# Patient Record
Sex: FEMALE | Employment: OTHER | URBAN - METROPOLITAN AREA
[De-identification: names, ages, dates, MRNs, and addresses within clinical notes are randomized per-mention and may not be internally consistent; named-entity substitution may affect disease eponyms.]

---

## 2023-08-16 ENCOUNTER — APPOINTMENT (OUTPATIENT)
Dept: RADIOLOGY | Facility: MEDICAL CENTER | Age: 78
End: 2023-08-16
Attending: EMERGENCY MEDICINE
Payer: MEDICARE

## 2023-08-16 ENCOUNTER — HOSPITAL ENCOUNTER (EMERGENCY)
Facility: MEDICAL CENTER | Age: 78
End: 2023-08-17
Attending: EMERGENCY MEDICINE
Payer: MEDICARE

## 2023-08-16 DIAGNOSIS — J06.9 UPPER RESPIRATORY TRACT INFECTION, UNSPECIFIED TYPE: ICD-10-CM

## 2023-08-16 DIAGNOSIS — J40 BRONCHITIS: ICD-10-CM

## 2023-08-16 LAB
ALBUMIN SERPL BCP-MCNC: 4.3 G/DL (ref 3.2–4.9)
ALBUMIN/GLOB SERPL: 1.1 G/DL
ALP SERPL-CCNC: 115 U/L (ref 30–99)
ALT SERPL-CCNC: 14 U/L (ref 2–50)
ANION GAP SERPL CALC-SCNC: 11 MMOL/L (ref 7–16)
AST SERPL-CCNC: 24 U/L (ref 12–45)
BASOPHILS # BLD AUTO: 0.3 % (ref 0–1.8)
BASOPHILS # BLD: 0.03 K/UL (ref 0–0.12)
BILIRUB SERPL-MCNC: 0.4 MG/DL (ref 0.1–1.5)
BUN SERPL-MCNC: 26 MG/DL (ref 8–22)
CALCIUM ALBUM COR SERPL-MCNC: 9.3 MG/DL (ref 8.5–10.5)
CALCIUM SERPL-MCNC: 9.5 MG/DL (ref 8.5–10.5)
CHLORIDE SERPL-SCNC: 99 MMOL/L (ref 96–112)
CO2 SERPL-SCNC: 25 MMOL/L (ref 20–33)
CREAT SERPL-MCNC: 0.69 MG/DL (ref 0.5–1.4)
EOSINOPHIL # BLD AUTO: 0.17 K/UL (ref 0–0.51)
EOSINOPHIL NFR BLD: 2 % (ref 0–6.9)
ERYTHROCYTE [DISTWIDTH] IN BLOOD BY AUTOMATED COUNT: 51.3 FL (ref 35.9–50)
FLUAV RNA SPEC QL NAA+PROBE: NEGATIVE
FLUBV RNA SPEC QL NAA+PROBE: NEGATIVE
GFR SERPLBLD CREATININE-BSD FMLA CKD-EPI: 88 ML/MIN/1.73 M 2
GLOBULIN SER CALC-MCNC: 4 G/DL (ref 1.9–3.5)
GLUCOSE SERPL-MCNC: 110 MG/DL (ref 65–99)
HCT VFR BLD AUTO: 42.8 % (ref 37–47)
HGB BLD-MCNC: 13.8 G/DL (ref 12–16)
IMM GRANULOCYTES # BLD AUTO: 0.01 K/UL (ref 0–0.11)
IMM GRANULOCYTES NFR BLD AUTO: 0.1 % (ref 0–0.9)
LACTATE SERPL-SCNC: 1.1 MMOL/L (ref 0.5–2)
LYMPHOCYTES # BLD AUTO: 3.2 K/UL (ref 1–4.8)
LYMPHOCYTES NFR BLD: 36.9 % (ref 22–41)
MCH RBC QN AUTO: 30.7 PG (ref 27–33)
MCHC RBC AUTO-ENTMCNC: 32.2 G/DL (ref 32.2–35.5)
MCV RBC AUTO: 95.3 FL (ref 81.4–97.8)
MONOCYTES # BLD AUTO: 1.17 K/UL (ref 0–0.85)
MONOCYTES NFR BLD AUTO: 13.5 % (ref 0–13.4)
NEUTROPHILS # BLD AUTO: 4.09 K/UL (ref 1.82–7.42)
NEUTROPHILS NFR BLD: 47.2 % (ref 44–72)
NRBC # BLD AUTO: 0 K/UL
NRBC BLD-RTO: 0 /100 WBC (ref 0–0.2)
PLATELET # BLD AUTO: 223 K/UL (ref 164–446)
PMV BLD AUTO: 11.1 FL (ref 9–12.9)
POTASSIUM SERPL-SCNC: 3.7 MMOL/L (ref 3.6–5.5)
PROCALCITONIN SERPL-MCNC: 0.16 NG/ML
PROT SERPL-MCNC: 8.3 G/DL (ref 6–8.2)
RBC # BLD AUTO: 4.49 M/UL (ref 4.2–5.4)
RSV RNA SPEC QL NAA+PROBE: NEGATIVE
SARS-COV-2 RNA RESP QL NAA+PROBE: NOTDETECTED
SODIUM SERPL-SCNC: 135 MMOL/L (ref 135–145)
SPECIMEN SOURCE: NORMAL
WBC # BLD AUTO: 8.7 K/UL (ref 4.8–10.8)

## 2023-08-16 PROCEDURE — C9803 HOPD COVID-19 SPEC COLLECT: HCPCS | Performed by: EMERGENCY MEDICINE

## 2023-08-16 PROCEDURE — 700111 HCHG RX REV CODE 636 W/ 250 OVERRIDE (IP): Mod: JZ | Performed by: EMERGENCY MEDICINE

## 2023-08-16 PROCEDURE — 96374 THER/PROPH/DIAG INJ IV PUSH: CPT

## 2023-08-16 PROCEDURE — 36415 COLL VENOUS BLD VENIPUNCTURE: CPT

## 2023-08-16 PROCEDURE — 0241U HCHG SARS-COV-2 COVID-19 NFCT DS RESP RNA 4 TRGT MIC: CPT

## 2023-08-16 PROCEDURE — 71045 X-RAY EXAM CHEST 1 VIEW: CPT

## 2023-08-16 PROCEDURE — 80053 COMPREHEN METABOLIC PANEL: CPT

## 2023-08-16 PROCEDURE — 84145 PROCALCITONIN (PCT): CPT

## 2023-08-16 PROCEDURE — 99285 EMERGENCY DEPT VISIT HI MDM: CPT

## 2023-08-16 PROCEDURE — 700101 HCHG RX REV CODE 250: Performed by: EMERGENCY MEDICINE

## 2023-08-16 PROCEDURE — 87040 BLOOD CULTURE FOR BACTERIA: CPT

## 2023-08-16 PROCEDURE — 85025 COMPLETE CBC W/AUTO DIFF WBC: CPT

## 2023-08-16 PROCEDURE — 83605 ASSAY OF LACTIC ACID: CPT

## 2023-08-16 RX ORDER — METHYLPREDNISOLONE SODIUM SUCCINATE 125 MG/2ML
125 INJECTION, POWDER, LYOPHILIZED, FOR SOLUTION INTRAMUSCULAR; INTRAVENOUS ONCE
Status: COMPLETED | OUTPATIENT
Start: 2023-08-16 | End: 2023-08-16

## 2023-08-16 RX ADMIN — METHYLPREDNISOLONE SODIUM SUCCINATE 125 MG: 125 INJECTION, POWDER, FOR SOLUTION INTRAMUSCULAR; INTRAVENOUS at 22:47

## 2023-08-16 RX ADMIN — ALBUTEROL SULFATE 2.5 MG: 2.5 SOLUTION RESPIRATORY (INHALATION) at 22:47

## 2023-08-17 VITALS
DIASTOLIC BLOOD PRESSURE: 66 MMHG | OXYGEN SATURATION: 92 % | HEIGHT: 64 IN | SYSTOLIC BLOOD PRESSURE: 152 MMHG | BODY MASS INDEX: 28.17 KG/M2 | WEIGHT: 165 LBS | RESPIRATION RATE: 17 BRPM | HEART RATE: 60 BPM | TEMPERATURE: 98 F

## 2023-08-17 PROCEDURE — 700102 HCHG RX REV CODE 250 W/ 637 OVERRIDE(OP): Performed by: EMERGENCY MEDICINE

## 2023-08-17 PROCEDURE — A9270 NON-COVERED ITEM OR SERVICE: HCPCS | Performed by: EMERGENCY MEDICINE

## 2023-08-17 RX ORDER — PREDNISONE 20 MG/1
40 TABLET ORAL DAILY
Qty: 10 TABLET | Refills: 0 | Status: SHIPPED | OUTPATIENT
Start: 2023-08-17 | End: 2023-08-22

## 2023-08-17 RX ORDER — BENZONATATE 100 MG/1
100 CAPSULE ORAL 3 TIMES DAILY PRN
Qty: 20 CAPSULE | Refills: 0 | Status: SHIPPED | OUTPATIENT
Start: 2023-08-17

## 2023-08-17 RX ORDER — ALBUTEROL SULFATE 90 UG/1
2 AEROSOL, METERED RESPIRATORY (INHALATION) EVERY 6 HOURS PRN
Qty: 8.5 G | Refills: 0 | Status: SHIPPED | OUTPATIENT
Start: 2023-08-17

## 2023-08-17 RX ORDER — ALBUTEROL SULFATE 90 UG/1
2 AEROSOL, METERED RESPIRATORY (INHALATION) ONCE
Status: COMPLETED | OUTPATIENT
Start: 2023-08-17 | End: 2023-08-17

## 2023-08-17 RX ADMIN — ALBUTEROL SULFATE 2 PUFF: 90 AEROSOL, METERED RESPIRATORY (INHALATION) at 00:18

## 2023-08-17 NOTE — ED NOTES
Patient provided discharge instructions. Patient verbalized understanding. Patient leaving ER in stable condition.  IV removed.

## 2023-08-17 NOTE — ED TRIAGE NOTES
Chief Complaint   Patient presents with    Cough     Productive cough X4 days.     Generalized Body Aches     X4 days. Increased weakness in the last two days.       Patient wheeled to triage for above complaint. A&Ox4, speaking in full sentences with  services.Pt educated on triage process, placed back in lobby, and instructed to inform staff of any changes.

## 2023-08-17 NOTE — DISCHARGE INSTRUCTIONS
Follow-up with primary care 1 to 2 days for reevaluation, to establish care, for medication management..    Continue home medications as previously indicated.  Add albuterol 2 puffs inhaled every 4-6 hours as needed for shortness of breath, cough or wheezing.  Add prednisone daily for 5 days.  Add Tessalon 3 times daily as needed for cough.  Add Tylenol or ibuprofen as needed for fever, body aches or other discomfort.    A cool-mist humidifier may be beneficial for cough and congestion.    Encourage oral fluid hydration.  Diabetic diet and activity as tolerated.    Return to the emergency department for intractable fever, altered mental status, difficulty breathing/wheezing/retractions, chest pain, syncope, vomiting or other new concerns.

## 2023-08-17 NOTE — ED PROVIDER NOTES
"ED Provider Note    CHIEF COMPLAINT  Chief Complaint   Patient presents with    Cough     Productive cough X4 days.     Generalized Body Aches     X4 days. Increased weakness in the last two days.        EXTERNAL RECORDS REVIEWED  Other no prior records for review.    HPI/ROS  LIMITATION TO HISTORY   Select: Language [place],  Used daughter at bedside, daughter over the phone.  OUTSIDE HISTORIAN(S):  Daughters, 1 at bedside and 1 over the phone    Alanna Martinez is a 77 y.o. female who presents to the emergency department through triage with her daughter for cough, congestion, body aches.  Patient with 4 days of flulike symptoms, increased cough and shortness of breath today.  Sputum production today as well.  Tactile fever.  Decreased oral intake.  No chest pain or syncope.  No abdominal pain, vomiting or diarrhea.    History hypertension, diabetes, compliant with home medications.  No history for lung disease, asthma, COPD.  Denies sick contacts or travel.    PAST MEDICAL HISTORY   has a past medical history of Diabetes (HCC) and Hypertension.    SURGICAL HISTORY  patient denies any surgical history    FAMILY HISTORY  History reviewed. No pertinent family history.    SOCIAL HISTORY  Social History     Tobacco Use    Smoking status: Never    Smokeless tobacco: Never   Substance and Sexual Activity    Alcohol use: Never    Drug use: Never    Sexual activity: Not on file       CURRENT MEDICATIONS  Home Medications    **Home medications have not yet been reviewed for this encounter**         ALLERGIES  No Known Allergies    PHYSICAL EXAM  VITAL SIGNS: BP (!) 152/66   Pulse 60   Temp 36.7 °C (98 °F)   Resp 17   Ht 1.626 m (5' 4\")   Wt 74.8 kg (165 lb)   SpO2 92%   BMI 28.32 kg/m²    Pulse ox interpretation: I interpret this pulse ox as normal.  Constitutional: Alert in no apparent distress.  HENT: Normocephalic, atraumatic. Bilateral external ears normal, Nose normal. Moist mucous membranes.  "   Eyes: Pupils are equal and reactive, Conjunctiva normal.   Neck: Normal range of motion, Supple meningeal irritation.  Lymphatic: No lymphadenopathy noted.   Cardiovascular: Regular rate and rhythm, no murmurs. Distal pulses intact.  No peripheral edema.  Thorax & Lungs: Diminished bilaterally, end expiratory wheeze diffusely.  Mild tachypnea without clip speech or retractions.  Moist cough on exam.  Abdomen: Soft, non-distended, non-tender to palpation.   Skin: Warm, Dry, No erythema, No rash.   Musculoskeletal: Good range of motion in all major joints.   Neurologic: Alert and orient x4.  Moves all extremity spontaneously  Psychiatric: Affect normal, Judgment normal, Mood normal.       DIAGNOSTIC STUDIES / PROCEDURES    LABS  Results for orders placed or performed during the hospital encounter of 08/16/23   CBC With Differential   Result Value Ref Range    WBC 8.7 4.8 - 10.8 K/uL    RBC 4.49 4.20 - 5.40 M/uL    Hemoglobin 13.8 12.0 - 16.0 g/dL    Hematocrit 42.8 37.0 - 47.0 %    MCV 95.3 81.4 - 97.8 fL    MCH 30.7 27.0 - 33.0 pg    MCHC 32.2 32.2 - 35.5 g/dL    RDW 51.3 (H) 35.9 - 50.0 fL    Platelet Count 223 164 - 446 K/uL    MPV 11.1 9.0 - 12.9 fL    Neutrophils-Polys 47.20 44.00 - 72.00 %    Lymphocytes 36.90 22.00 - 41.00 %    Monocytes 13.50 (H) 0.00 - 13.40 %    Eosinophils 2.00 0.00 - 6.90 %    Basophils 0.30 0.00 - 1.80 %    Immature Granulocytes 0.10 0.00 - 0.90 %    Nucleated RBC 0.00 0.00 - 0.20 /100 WBC    Neutrophils (Absolute) 4.09 1.82 - 7.42 K/uL    Lymphs (Absolute) 3.20 1.00 - 4.80 K/uL    Monos (Absolute) 1.17 (H) 0.00 - 0.85 K/uL    Eos (Absolute) 0.17 0.00 - 0.51 K/uL    Baso (Absolute) 0.03 0.00 - 0.12 K/uL    Immature Granulocytes (abs) 0.01 0.00 - 0.11 K/uL    NRBC (Absolute) 0.00 K/uL   Comp Metabolic Panel   Result Value Ref Range    Sodium 135 135 - 145 mmol/L    Potassium 3.7 3.6 - 5.5 mmol/L    Chloride 99 96 - 112 mmol/L    Co2 25 20 - 33 mmol/L    Anion Gap 11.0 7.0 - 16.0     Glucose 110 (H) 65 - 99 mg/dL    Bun 26 (H) 8 - 22 mg/dL    Creatinine 0.69 0.50 - 1.40 mg/dL    Calcium 9.5 8.5 - 10.5 mg/dL    Correct Calcium 9.3 8.5 - 10.5 mg/dL    AST(SGOT) 24 12 - 45 U/L    ALT(SGPT) 14 2 - 50 U/L    Alkaline Phosphatase 115 (H) 30 - 99 U/L    Total Bilirubin 0.4 0.1 - 1.5 mg/dL    Albumin 4.3 3.2 - 4.9 g/dL    Total Protein 8.3 (H) 6.0 - 8.2 g/dL    Globulin 4.0 (H) 1.9 - 3.5 g/dL    A-G Ratio 1.1 g/dL   Lactic Acid   Result Value Ref Range    Lactic Acid 1.1 0.5 - 2.0 mmol/L   CoV-2, Flu A/B, And RSV by PCR ([employer])    Specimen: Respirate   Result Value Ref Range    Influenza virus A RNA Negative Negative    Influenza virus B, PCR Negative Negative    RSV, PCR Negative Negative    SARS-CoV-2 by PCR NotDetected     SARS-CoV-2 Source NP Swab    PROCALCITONIN   Result Value Ref Range    Procalcitonin 0.16 <0.25 ng/mL   ESTIMATED GFR   Result Value Ref Range    GFR (CKD-EPI) 88 >60 mL/min/1.73 m 2     RADIOLOGY  I have independently interpreted the diagnostic imaging associated with this visit and am waiting the final reading from the radiologist.   My preliminary interpretation is as follows:   Chest x-ray: Normal lung fields, no consolidation or effusion    Radiologist interpretation:   DX-CHEST-PORTABLE (1 VIEW)   Final Result         1.  No acute cardiopulmonary disease.   2.  Linear density in the left midlung favoring changes of scarring   3.  Atherosclerosis            COURSE & MEDICAL DECISION MAKING    ED Observation Status? Yes; I am placing the patient in to an observation status due to a diagnostic uncertainty as well as therapeutic intensity. Patient placed in observation status at 10:10 PM, 8/16/2023.     Observation plan is as follows: Labs, imaging, bronchodilator therapy before final disposition can be made    Upon Reevaluation, the patient's condition has: Improved; and will be discharged.    Patient discharged from ED Observation status at 12:14 AM  (Time) 8/17/23 (Date).      INITIAL ASSESSMENT, COURSE AND PLAN  Care Narrative:   10:11 PM Seen evaluated at bedside.  Moist cough, diminished lung sounds, diffuse wheezing with mild increased work of breathing.  No hypoxia at this time.  Add labs for septic protocol, chest x-ray.  Add albuterol neb, Solu-Medrol.    12:15 AM patient reevaluated at bedside.  Tachypnea, hacking cough and wheezes have all resolved with single albuterol neb.  Lung aeration is clear throughout.  Patient is feeling much better.    ADDITIONAL PROBLEM LIST  Hypertension, diabetes -compliant with home medications    DISPOSITION AND DISCUSSIONS  ED evaluation most suggestive of upper respiratory infection, bronchitis with wheezing, likely viral etiology.  No clinical evidence for pneumonia or sepsis.  Hemodynamically stable without fever, tachycardia, hypotension or hypoxia.  Tachypneic with diffuse wheezes and diminished lung sounds initially, resolved with albuterol neb, Solu-Medrol.  We will continue the same at home for 5 days, add Tessalon for cough as needed.  Labs are unremarkable, no leukocytosis, left shift or bandemia.  Procalcitonin is normal.  No electrolyte derangement.  Viral studies are negative.  Will trial discharge home with close outpatient follow-up and strict ED return instructions.    Escalation of care considered, and ultimately not performed:acute inpatient care management, however at this time, the patient is most appropriate for outpatient management    Decision tools and prescription drugs considered including, but not limited to: Antibiotics not indicated and presumed viral setting .    The patient is stable for discharge home, anticipatory guidance provided, albuterol as needed, prednisone daily for 5 days, Tessalon for cough, close follow-up is encouraged and strict return instructions have been discussed. Patient and her daughter are agreeable to the disposition and plan.      FINAL DIAGNOSIS  1. Upper respiratory tract infection,  unspecified type    2. Bronchitis           Electronically signed by: Therese Cano D.O., 8/16/2023 10:09 PM

## 2023-08-22 LAB
BACTERIA BLD CULT: NORMAL
BACTERIA BLD CULT: NORMAL
SIGNIFICANT IND 70042: NORMAL
SIGNIFICANT IND 70042: NORMAL
SITE SITE: NORMAL
SITE SITE: NORMAL
SOURCE SOURCE: NORMAL
SOURCE SOURCE: NORMAL